# Patient Record
Sex: MALE | Race: BLACK OR AFRICAN AMERICAN | URBAN - METROPOLITAN AREA
[De-identification: names, ages, dates, MRNs, and addresses within clinical notes are randomized per-mention and may not be internally consistent; named-entity substitution may affect disease eponyms.]

---

## 2022-02-07 ENCOUNTER — OFFICE VISIT (OUTPATIENT)
Dept: SURGERY | Facility: CLINIC | Age: 37
End: 2022-02-07
Payer: COMMERCIAL

## 2022-02-07 VITALS
HEIGHT: 66 IN | HEART RATE: 62 BPM | WEIGHT: 207 LBS | DIASTOLIC BLOOD PRESSURE: 66 MMHG | RESPIRATION RATE: 16 BRPM | BODY MASS INDEX: 33.27 KG/M2 | SYSTOLIC BLOOD PRESSURE: 107 MMHG

## 2022-02-07 DIAGNOSIS — Z30.09 VASECTOMY EVALUATION: Primary | ICD-10-CM

## 2022-02-07 PROCEDURE — 99244 OFF/OP CNSLTJ NEW/EST MOD 40: CPT | Performed by: UROLOGY

## 2022-02-07 PROCEDURE — 3074F SYST BP LT 130 MM HG: CPT | Performed by: UROLOGY

## 2022-02-07 PROCEDURE — 3008F BODY MASS INDEX DOCD: CPT | Performed by: UROLOGY

## 2022-02-07 PROCEDURE — 3078F DIAST BP <80 MM HG: CPT | Performed by: UROLOGY

## 2022-11-02 ENCOUNTER — OFFICE VISIT (OUTPATIENT)
Dept: SURGERY | Facility: CLINIC | Age: 37
End: 2022-11-02
Payer: COMMERCIAL

## 2022-11-02 DIAGNOSIS — Z30.2 ENCOUNTER FOR STERILIZATION: Primary | ICD-10-CM

## 2022-11-02 PROCEDURE — 99213 OFFICE O/P EST LOW 20 MIN: CPT | Performed by: SURGERY

## 2022-11-02 RX ORDER — NAPROXEN 500 MG/1
TABLET ORAL
COMMUNITY
Start: 2022-10-19

## 2022-11-17 ENCOUNTER — PROCEDURE (OUTPATIENT)
Dept: SURGERY | Facility: CLINIC | Age: 37
End: 2022-11-17
Payer: COMMERCIAL

## 2022-11-17 DIAGNOSIS — Z30.2 ENCOUNTER FOR STERILIZATION: Primary | ICD-10-CM

## 2022-11-17 DIAGNOSIS — Z30.2 STERILIZATION: ICD-10-CM

## 2022-11-17 PROCEDURE — 55250 REMOVAL OF SPERM DUCT(S): CPT | Performed by: SURGERY

## 2022-11-17 NOTE — PROCEDURES
Clinic Procedure Note    INDICATIONS:   Sherin Chamberlain is a 42year old male desiring elective sterility via bilateral vasectomy. PROCEDURE:       1. Bilateral vasectomy    DATE OF PROCEDURE: 11/17/2022     PRE-PROCEDURE DIAGNOSIS: Family Planning/Desires Male Sterility    POST-PROCEDURE DIAGNOSIS: Same     SURGEON: Derek Melvin MD    ANESTHESIA: Local (10 mL of lidocaine 1% plain)    SPECIMENS: Segment of RIGHT vas, segment of LEFT vas    FINDINGS:  Normal bilateral vasectomy performed. DISCUSSION:  We discussed that a vasectomy is intended to be a permanent form of contraception and that if he desires fertility after vasectomy, options included vasectomy reversal and sperm retrieval with possible in vitro fertilization. These options are not always successful and may be very expensive. We also discussed the risks of vasectomy which include symptomatic hematoma and infection (1-2%), chronic scrotal pain (6%; caused by congestive epididymitis, sperm granuloma and/or infective epididymoorchitis), need for repeat vasectomy (<1% of cases), need for additional procedures, vasectomy failure, and unwanted pregnancy (1 in 2000 men). The chronic scrotal discomfort may be no more than a low-grade chronic ache that causes little disability and requires only symptomatic treatment. However, a small percentage of patients will be sufficiently debilitated to seek epididymectomy or even orchiectomy (removal of the epididymis and/or testicle). Furthermore, for a very small number of patients, even this radical surgery will not provide relief from their scrotal discomfort. We discussed that he will have two small incisions in his scrotum with dissolvable sutures. He was told that vasectomy does NOT produce immediate sterility and that following vasectomy, another form of contraception is required until vas occlusion is confirmed by post-vasectomy semen analysis.  Post-vasectomy semen analysis will be obtained 10-15 weeks after the vasectomy. He was told that he may stop using other methods of contraception when examination of one well-mixed, uncentrifuged, fresh post-vasectomy semen specimen shows azoospermia or only rare non-motile sperm. He was once again told that even after vas occlusion is confirmed, vasectomy is not 100% reliable in preventing pregnancy and that the risk of pregnancy after vasectomy is approximately 1 in 2,000 men who have post-vasectomy azoospermia or semen analysis showing rare non-motile sperm. The reasons are early recanalization of the vas deferens or the presence of an accessory vas unrecognized at the time of surgery. There have also been cases of DNA-confirmed paternity despite documented azoospermia before and after conception. He was told to refrain from ejaculation for approximately one week after vasectomy. After discussion of all risks and benefits the patient elected to proceed and informed consent form was signed. PROCEDURE:   After the appropriate time out checklist was performed, confirming the correct patient and procedure, the scrotum was prepped and draped in standard fashion. The right vas was grasped and brought up underneath the skin surface. The skin, Dartos, and vasal sheath were injected with lidocaine. Once the skin was numb, a #15 scalpel was used to make a small incision overlying the vas in the right hemiscrotum. A small snap was used to spread the tissue overlying the vas to make room for the vas ring forceps. The ring forceps was used to grasp the vas deferens to hold it in place. I used the 15 blade to cut away the vasal sheath and used additional vas clamps to re-grasp the isolated vas. I then pushed down any remaining attached vasal sheath and secured each end of the vas with a small mosquito clamp. Small metal clips were placed on the testicular and abdominal ends of the transected vas.  A 1 cm segment of the vas was excised and passed off the field for specimen. The needle tip Bovie was used to cauterize the lumen of both the abdominal and testicular ends of the transected vas. Any present bleeding vessels were treated with a combination of cautery and additional metals clips. There was no bleeding seen from the vasal stumps, and they were dropped back into the scrotum. The dartos muscle was cauterized. The skin incision was closed with an interrupted horizontal mattress suture using 3-0 chromic. The left vas was grasped and brought up underneath the skin surface. The skin, Dartos, and vasal sheath were injected with lidocaine. Once the skin was numb, a #15 scalpel was used to make a small incision overlying the vas in the left hemiscrotum. A small snap was used to spread the tissue overlying the vas to make room for the vas ring forceps. The ring forceps was used to grasp the vas deferens to hold it in place. I used the 15 blade to cut away the vasal sheath and used additional vas clamps to re-grasp the isolated vas. I then pushed down any remaining attached vasal sheath and secured each end of the vas with a small mosquito clamp. Small metal clips were placed on the testicular and abdominal ends of the transected vas. A 1 cm segment of the vas was excised and passed off the field for specimen. The needle tip Bovie was used to cauterize the lumen of both the abdominal and testicular ends of the transected vas. Any present bleeding vessels were treated with a combination of cautery and additional metals clips. There was no bleeding seen from the vasal stumps, and they were dropped back into the scrotum. The dartos muscle was cauterized. The skin incision was closed with an interrupted horizontal mattress suture using 3-0 chromic. Fluff gauze and scrotal support were placed. DISPOSITION: Home    FOLLOW-UP: Post-procedure care instructions were given to the patient. Post-vasectomy semen analysis will be performed 10-15 weeks after vasectomy.  Patient knows to use another form of contraception until vasal occlusion is confirmed by post-vasectomy semen analysis. Nishant Garcia.  Cara Galan MD  Staff Urologist  [place] [address]  Office: [phone]

## 2022-12-14 ENCOUNTER — TELEPHONE (OUTPATIENT)
Dept: SURGERY | Facility: CLINIC | Age: 37
End: 2022-12-14

## 2022-12-14 DIAGNOSIS — Z98.52 STATUS POST VASECTOMY: Primary | ICD-10-CM

## 2022-12-14 NOTE — TELEPHONE ENCOUNTER
Per pt had VAS, needs order to drop off semen specimen, please call once order is placed and provide instructions. Please call thank you.

## 2022-12-16 NOTE — TELEPHONE ENCOUNTER
SA order placed. Called patient and provided instructions. He agreed to plans and verbalized understanding. For the [facility]:   1.  For Post Vasectomy Check:   Deliver specimen within 5 hours of collection to the [facility] lab located near the Emergency Department of the hospital.  Monday through Friday 6:30 AM - 7:00 pm,   Saturday 6:30am - 2:30 pm.

## 2023-02-16 ENCOUNTER — LAB ENCOUNTER (OUTPATIENT)
Dept: LAB | Facility: HOSPITAL | Age: 38
End: 2023-02-16
Attending: SURGERY
Payer: COMMERCIAL

## 2023-02-16 DIAGNOSIS — Z98.52 STATUS POST VASECTOMY: ICD-10-CM

## 2023-02-16 PROCEDURE — 89310 SEMEN ANALYSIS W/COUNT: CPT

## 2024-05-22 DIAGNOSIS — J90 PLEURAL EFFUSION: Primary | ICD-10-CM

## 2024-05-28 ENCOUNTER — APPOINTMENT (OUTPATIENT)
Dept: CT IMAGING | Age: 39
End: 2024-05-28
Attending: NURSE PRACTITIONER

## 2024-06-04 ENCOUNTER — HOSPITAL ENCOUNTER (OUTPATIENT)
Dept: CT IMAGING | Age: 39
Discharge: HOME | End: 2024-06-04
Attending: NURSE PRACTITIONER

## 2024-06-04 DIAGNOSIS — J90 PLEURAL EFFUSION: ICD-10-CM

## 2024-06-04 PROCEDURE — 71250 CT THORAX DX C-: CPT
